# Patient Record
(demographics unavailable — no encounter records)

---

## 2018-01-01 NOTE — PN
Interval History: 


 Intake and Output











 18





 06:59 07:59 08:59 09:59


 


Weight    6 lb 7.353 oz








Method of Feeding: Breast feeding


Feeding Frequency: Ad Cortney


Feeding Status: Without Difficulty


Maternal Nipple Condition: Bilateral Normal





Measurements


Current Weight: 6 lb 7.353 oz


Weight in lbs and ozs: 6 lbs and 7 oz


Weight Yesterday: 6 lb 11.938 oz


Weight Gain/Loss Since Last Weight In Grams: 130.0 Loss


Birth Weight: 6 lb 12.432 oz


Birthweight in lbs and ozs: 6 lbs and 12 oz


% Weight Gain/Loss from Birth Weight: 5% Loss


Length: 18.5 in


Head Circumference in inches: 14.5


Abdominal Girth in cm: 30.5


Abdominal Girth in inches: 12.008





Vitals


Vital Signs: 


 Vital Signs











  18





  12:00 16:25 20:45


 


Temperature 98.1 F 98.6 F 99.2 F


 


Pulse Rate 142 144 142


 


Respiratory 44 44 48





Rate   














  18





  00:02 04:00 07:36


 


Temperature 98.7 F 98.2 F 98.1 F


 


Pulse Rate 136 142 144


 


Respiratory 44 50 42





Rate   














Medications


Home Medications: 


 Home Medications











 Medication  Instructions  Recorded  Confirmed  Type


 


NK [No Home Medications Reported]  18 History











Inpatient Medications: 


 Medications





Dextrose (Glutose Oral Nicu*)  0 ml BUCCAL .SEE MD INSTRUCTIONS PRN; Protocol


   PRN Reason: ASYMTOMATIC HYPOGLYCEMIA











Results/Investigations


Transcutaneous Bilirubin Result: 1.5


Time Obtained: 04:00


Age in Hours: 33


Risk Zone: Low Risk


Major Jaundice Risk Factors: None


Minor Jaundice Risk Factors: Breastfeeding, Mother > 24 yrs old


Decreased Jaundice Risk: Bili in low risk zone


CCHD Screen: Passed


Lab Results: 


 











  18





  18:31 18:31 18:31


 


WBC   


 


RBC   


 


Hgb   


 


Hct   


 


MCV   


 


MCH   


 


MCHC   


 


RDW   


 


Plt Count   


 


MPV   


 


Neut % (Auto)   


 


Lymph % (Auto)   


 


Mono % (Auto)   


 


Eos % (Auto)   


 


Baso % (Auto)   


 


Absolute Neuts (auto)   


 


Absolute Lymphs (auto)   


 


Absolute Monos (auto)   


 


Absolute Eos (auto)   


 


Absolute Basos (auto)   


 


Absolute Nucleated RBC   


 


Nucleated RBC %   


 


POC Glucose (mg/dL)   


 


Total Bilirubin  1.10  


 


RPR   Nonreactive 


 


Blood Type    O Positive


 


Direct Antiglob Test    Negative














  18





  19:50 21:36 00:42


 


WBC   


 


RBC   


 


Hgb   


 


Hct   


 


MCV   


 


MCH   


 


MCHC   


 


RDW   


 


Plt Count   


 


MPV   


 


Neut % (Auto)   


 


Lymph % (Auto)   


 


Mono % (Auto)   


 


Eos % (Auto)   


 


Baso % (Auto)   


 


Absolute Neuts (auto)   


 


Absolute Lymphs (auto)   


 


Absolute Monos (auto)   


 


Absolute Eos (auto)   


 


Absolute Basos (auto)   


 


Absolute Nucleated RBC   


 


Nucleated RBC %   


 


POC Glucose (mg/dL)  50  60  54


 


Total Bilirubin   


 


RPR   


 


Blood Type   


 


Direct Antiglob Test   














  18





  04:41 09:15


 


WBC   15.0


 


RBC   6.10


 


Hgb   22.5


 


Hct   67


 


MCV   109


 


MCH   37


 


MCHC   34


 


RDW   18 H


 


Plt Count   260


 


MPV   8


 


Neut % (Auto)   61.7


 


Lymph % (Auto)   24.7 L


 


Mono % (Auto)   9.7 H


 


Eos % (Auto)   2.7


 


Baso % (Auto)   1.2


 


Absolute Neuts (auto)   9.2


 


Absolute Lymphs (auto)   3.7


 


Absolute Monos (auto)   1.5 H


 


Absolute Eos (auto)   0.4


 


Absolute Basos (auto)   0.2


 


Absolute Nucleated RBC   0.4


 


Nucleated RBC %   2.4


 


POC Glucose (mg/dL)  50 


 


Total Bilirubin  


 


RPR  


 


Blood Type  


 


Direct Antiglob Test  











Assessment: 


Lactation Note:


FT AGA infant born 18 at 1830 via  to a 31 yo -4 mother who is  O+

.  maternal history of suicidal ideation, smoking 1 ppd, and gestational 

diabetes.  Infant has had stable blood glucoses, she has been cleared by social 

work to return home with family. Mother notes she tried to breastfeed older 

children, but milk "dried up" after about 2 weeks.  Now at 5% weight loss. 

Mother notes  no pain or pain and pinching with most feeds.





We reviewed positioning at length; ideally mother slightly reclined to a 

position of comfort, infant skin to skin, with ear/shoulder/hips in alignment. 

Reviewed how to pull the bottom jaw down, and guide infant onto the breast in a 

more deep fashion. Infant latched in cross cradle position,  Lips are flanged, 

mother comfortable; good jaw undulation noted. 





Plan discharge today; reviewed ideally feeding once every 2-3 hours once home, 

and will follow up in the office tomorrow,  with Dr Radford at 10:45.

## 2018-01-01 NOTE — HP
Information from Mother's Record: 





 Previous Pregnancy/Births











Maternal Age                   32


 


Grav                           6


 


Para                           3


 


SAB                            2


 


IEA                            0


 


LC                             3


 


Maternal Blood Type and Rh     O Positive








 Testing Needs/Results











Gestational Age in Weeks and   39 Weeks and 2 Days





Days                           


 


Determined By                  LMP


 


Violence or Abuse During this  No





Pregnancy                      


 


Feeding Plan                   Breast,Formula,Undecided


 


Planned Infant Care Provider   [facility]





Post-Discharge                 


 


Serology/RPR Result            Non-Reactive


 


Rubella Result                 Immune


 


HBsAg Result                   Negative


 


HIV Result                     Negative


 


GBS Culture Result             Negative











 Significant Medical History











Hx Diabetes                    No


 


Hx Thyroid Disease             No


 


Hx Hypothyroidism              No


 


Hx Hypertension                No


 


Hx Depression                  Yes


 


Hx Anxiety                     Yes


 


Other Psychiatric Issues/      Yes: reports  hospitalization for suicide





Disorders                      ideation, but "i wasn't suicidal"


 


Hx Asthma                      No: uses albuterol for occsional "illness"


 


Hx Kidney Infection            Yes


 


Hx  Section            No


 


Hx Other Reproductive          Yes: H/O retained placenta





Disorders/Problems             


 


Other Pertinent Medical        Postpartum hem X3





History                        








 Tobacco/Alcohol/Substance Use











Smoking Status (MU)            Heavy Tobacco Smoker


 


Type                           Cigarettes


 


Amount Used/How Often          1 ppd


 


Household Exposure             Yes


 


Household Exposure Type        Cigarettes


 


Alcohol Use                    Occasionally


 


Alcohol Amount                 pt reports havng "5 sips" 2/10/18


 


Substance Use Type             None


 


Substance Use Comment - Amount stopped marijuana after pregnancy





& Last Used                    








 Delivery Information/Events of Note











Date of Birth [A]              18


 


Time of Birth [A]              18:30


 


Delivery Method [A]            Spontaneous Vaginal


 


Labor [A]                      Induced


 


Did Patient attempt ? [A]  N/A, No Previous C-Sectio


 


Amniotic Fluid [A]             Clear


 


Anesthesia/Analgesia [A]       None


 


Level of Nursery               Regular/Bedside


 


Delivery Events of Note        Pitocin During Labor


 


Delivery Events of Note        pitocin and cytotec post delivery





Comment                        

















Delivery Events


Date of Birth: 18


Time of Birth: 18:30


Apgar Score 1 Minute: 9


Apgar Score 5 Minutes: 9


Gestational Age Weeks: 39


Gestational Age Days: 2


Delivery Type: Vaginal


Amniotic Fluid: Clear


Intrapartal Antibiotics Indicated: None Apply


ROM Length: ROM < 18 Hours


Antibiotic Treatment: No Antibx, or ANY Antibx Given < 2hrs Prior to Delivery


Hepatitis B Vaccine: Given Within 12 Hours


Immunoglobulin Given: No


 Drug Withdrawal Risk: None Apply


Hepatitis B Status/Risk: Mother HBsAg NEGATIVE With No New Risk Factors


Maternal Consent: Mother CONSENTS To Infant Hepatitis Vaccine +/- HBIG


Additional Identified Prenatal/Delivery Events of Concern: mother admits to 

drinking day before induction.   drug screen ordered per provider





Hypoglycemia Assessment


Hypoglycemia Risk - High: Gestational Diabetes


Hypoglycemia Symptoms: None





Measurements


Current Weight: 3.06 kg


Weight in lbs and ozs: 6 lbs and 12 oz


Weight Yesterday: 3.074 kg


Weight Gain/Loss Since Last Weight In Grams: 14.0 Loss


Birth Weight: 3.074 kg


Birthweight in lbs and ozs: 6 lbs and 12 oz


% Weight Gain/Loss from Birth Weight: No Change


Length: 18.5 in


Head Circumference in inches: 14.5


Abdominal Girth in cm: 30.5


Abdominal Girth in inches: 12.008





Vitals


Vital Signs: 


 Vital Signs











  18





  19:00 20:26 20:30


 


Temperature 98.2 F 97.8 F 99.2 F


 


Pulse Rate 148 130 160


 


Respiratory 52 40 48





Rate   














  18





  21:40 22:30 00:20


 


Temperature 98.1 F 98.2 F 98.2 F


 


Pulse Rate 146 140 128


 


Respiratory 50 56 60





Rate   














  18





  04:20 08:07


 


Temperature 99.3 F 98.8 F


 


Pulse Rate 124 144


 


Respiratory 60 44





Rate  














[place] Physical Exam


General Appearance: Alert, Active


Skin Color: Normal


Level of Distress: No Distress


Nutritional Status: AGA


General Appearance Description: 





mild perioral cyanosis


Cranial Features: Normal head shape, Symmetric facial features, Normal 

fontanelles


Eyes: Bilateral Normal, Bilateral Red Reflex


Ears: Symmetrical, Normal Position, Canals Patent


Oropharynx: Normal: Lips, Mouth, Gums, Uvula


Neck: Normal Tone


Respiratory Effort: Normal


Respiratory Rate: Normal


Chest Appearance: Normal, Areola Breast 3-4 mm Size, Symmetrical


Auscultation: Bilateral Good Air Exchange


Breath Sounds: NL Both Lungs


Location of Apical Pulse: Normal


Rhythm: Regular


Heart Sounds: Normal: S1, S2


Abnormal Heart Sounds: No Murmurs, No S3, No S4


Brachial Pulses: Bilateral Normal


Femoral Pulses: Bilateral Normal


Umbilicus Assessment: Yes Normal


Abdomen: Normal


Abdomen Palpation: Liver Normal, Spleen Normal


Hernia: None


Anus: Patent


Location of Anus: Normal


Genital Appearance: Female


Enlarged Nodes: None


External Genitalia: Normal: Labia, Clitoris, Introitus


Urethral Meatus: Normal


Vagina: Normal for Gestational Age


Clavicles: Normal


Arms: 2 Symmetrical Extremities, Full Range of Motion


Hands: 2 Hands, Symmetrical, 5 Fingers on Each Hand, Full Range of Motion


Left Hip: Normal ROM


Right Hip: Normal ROM


Legs: 2 Symmetrical Extremities, Full Range of Motion


Feet: 2 Feet, Symmetrical, Creases on 2/3 of Soles, Full Range of Motion


Spine: Normal


Skin Texture: Smooth, Soft


Skin Appearance: No Abnormalities


Neuro: Normal: Simone, Sucking, Muscle Tone


Cranial Nerve Exam: Cranial N. II-XII Normal


Deep Tendon Reflexes: Normal: Bicep, Knee, Ankle





Medications


Home Medications: 


 Home Medications











 Medication  Instructions  Recorded  Confirmed  Type


 


NK [No Home Medications Reported]  18 History











Inpatient Medications: 


 Medications





Dextrose (Glutose Oral Nicu*)  0 ml BUCCAL .SEE MD INSTRUCTIONS PRN; Protocol


   PRN Reason: ASYMTOMATIC HYPOGLYCEMIA











Results/Investigations


Lab Results: 


 











  18





  18:31 18:31 19:50


 


POC Glucose (mg/dL)    50


 


Total Bilirubin  1.10  


 


Blood Type   O Positive 


 


Direct Antiglob Test   Negative 














  18





  21:36 00:42 04:41


 


POC Glucose (mg/dL)  60  54  50


 


Total Bilirubin   


 


Blood Type   


 


Direct Antiglob Test   











-: 





pre and post ductal sats 95-96%





Assessment





- Status


Status: Full-term, AGA


Condition: Stable


Assessment: 





Healthy FT infant, DOL #1.  Nursing concerns raised overnight about mother's 

status, as she was noted to be unable to stay awake, stumbling and behaving as 

if under the influence of drugs or alcohol.   MOther's urine screen was 

negative. SW consult pending as other children are not in mother's care and 

mother appeared too sleepy to safely care for her child.  Mother notes to 

examiner that she had not slept for 3 days and was exhausted.  Examiner 

explained that because of concerns raised by nursing staff about possible drug 

use, that we needed to screen the infant and she had no objections.

## 2018-01-01 NOTE — DS
Prenatal Information: 





 Previous Pregnancy/Births











Maternal Age                   32


 


Grav                           6


 


Para                           3


 


SAB                            2


 


IEA                            0


 


LC                             3


 


Maternal Blood Type and Rh     O Positive








 Testing Needs/Results











Gestational Age in Weeks and   39 Weeks and 2 Days





Days                           


 


Determined By                  LMP


 


Violence or Abuse During this  No





Pregnancy                      


 


Feeding Plan                   Breast,Formula,Undecided


 


Planned Infant Care Provider   [facility]





Post-Discharge                 


 


Serology/RPR Result            Non-Reactive


 


Rubella Result                 Immune


 


HBsAg Result                   Negative


 


HIV Result                     Negative


 


GBS Culture Result             Negative











 Significant Medical History











Hx Diabetes                    No


 


Hx Thyroid Disease             No


 


Hx Hypothyroidism              No


 


Hx Hypertension                No


 


Hx Depression                  Yes


 


Hx Anxiety                     Yes


 


Other Psychiatric Issues/      Yes: reports 2014 hospitalization for suicide





Disorders                      ideation, but "i wasn't suicidal"


 


Hx Asthma                      No: uses albuterol for occsional "illness"


 


Hx Kidney Infection            Yes


 


Hx  Section            No


 


Hx Other Reproductive          Yes: H/O retained placenta





Disorders/Problems             


 


Other Pertinent Medical        Postpartum hem X3





History                        








 Tobacco/Alcohol/Substance Use











Smoking Status (MU)            Heavy Tobacco Smoker


 


Type                           Cigarettes


 


Amount Used/How Often          1 ppd


 


Household Exposure             Yes


 


Household Exposure Type        Cigarettes


 


Alcohol Use                    Occasionally


 


Alcohol Amount                 pt reports havng "5 sips" 2/10/18


 


Substance Use Type             None


 


Substance Use Comment - Amount stopped marijuana after pregnancy





& Last Used                    








 Delivery Information/Events of Note











Date of Birth [A]              18


 


Time of Birth [A]              18:30


 


Delivery Method [A]            Spontaneous Vaginal


 


Labor [A]                      Induced


 


Did Patient attempt ? [A]  N/A, No Previous C-Sectio


 


Amniotic Fluid [A]             Clear


 


Anesthesia/Analgesia [A]       None


 


Level of Nursery               Regular/Bedside


 


Delivery Events of Note        Pitocin During Labor


 


Delivery Events of Note        pitocin and cytotec post delivery





Comment                        

















Delivery Events


Date of Birth: 18


Time of Birth: 18:30


Apgar Score 1 Minute: 9


Apgar Score 5 Minutes: 9


Gestational Age Weeks: 39


Gestational Age Days: 2


Delivery Type: Vaginal


Amniotic Fluid: Clear


Intrapartal Antibiotics Indicated: None Apply


ROM Length: ROM < 18 Hours


Antibiotic Treatment: No Antibx, or ANY Antibx Given < 2hrs Prior to Delivery


Hepatitis B Vaccine: Given Within 12 Hours


Immunoglobulin Given: No


 Drug Withdrawal Risk: None Apply


Hepatitis B Status/Risk: Mother HBsAg NEGATIVE With No New Risk Factors


Maternal Consent: Mother CONSENTS To Infant Hepatitis Vaccine +/- HBIG


Additional Identified Prenatal/Delivery Events of Concern: mother admits to 

drinking day before induction.   drug screen ordered per provider


Date of Service: 18


Interval History: 





Baby stable overnight. Perioral cyanosis noted on exam yesterday - nurses 

report that it has resolved at this time. 





Just after delivery there were nursing concerns that mother was difficulty to 

arose and there was concern raised that mother was potentially under the 

influence of alcohol or drugs. Urine and meconium drug screens were sent on 

baby and results are pending. SW consult yesterday. Day shift nurse reports 

that mother has been awake and appropriate with baby both yesterday and today. 





Mother is breast feeding. Baby is voiding and stooling well. Stools are 

transitional at this time. 





Mother w/ hx of GDM. BG checks WNLs. 





Initially CCHD screening w/ O2 sats 95%/95%; this was repeated on day of 

discharge and repeat showed 100%/100%.





Method of Feeding: Breast feeding


Feeding Frequency: Ad Estefanía


Stool Passed: Yes


Stools in Past 24 Hours: 2


Voiding: Yes


Times Voided in Past 24 Hours: 3





Measurements


Current Weight: 2.93 kg


Weight in lbs and ozs: 6 lbs and 7 oz


Weight Yesterday: 3.06 kg


Weight Gain/Loss Since Last Weight In Grams: 130.0 Loss


Birth Weight: 3.074 kg


Birthweight in lbs and ozs: 6 lbs and 12 oz


% Weight Gain/Loss from Birth Weight: 5% Loss


Length: 18.5 in


Head Circumference in inches: 14.5


Abdominal Girth in cm: 30.5


Abdominal Girth in inches: 12.008





Vitals


Vital Signs: 


 Vital Signs











  18





  12:00 16:25 20:45


 


Temperature 98.1 F 98.6 F 99.2 F


 


Pulse Rate 142 144 142


 


Respiratory 44 44 48





Rate   














  18





  00:02 04:00 07:36


 


Temperature 98.7 F 98.2 F 98.1 F


 


Pulse Rate 136 142 144


 


Respiratory 44 50 42





Rate   














[place] Physical Exam


General Appearance: Alert, Active


Skin Color: Normal


Level of Distress: No Distress


Cranial Features: Normal head shape, Normal fontanelles


Neck: Normal Tone


Respiratory Effort: Normal


Respiratory Rate: Normal


Auscultation: Bilateral Good Air Exchange


Breath Sounds: NL Both Lungs


Rhythm: Regular


Abnormal Heart Sounds: No Murmurs, No S3, No S4


Femoral Pulses: Bilateral Normal


Umbilicus Assessment: Yes Normal


Abdomen: Normal


Abdomen Palpation: Liver Normal, Spleen Normal


Clavicles: Normal


Left Hip: Normal ROM


Right Hip: Normal ROM


Skin Texture: Smooth, Soft


Skin Appearance: No Abnormalities


Neuro: Normal: Simone, Sucking, Muscle Tone


Cranial Nerve Exam: Cranial N. II-XII Normal





Medications


Home Medications: 


 Home Medications











 Medication  Instructions  Recorded  Confirmed  Type


 


NK [No Home Medications Reported]  18 History











Inpatient Medications: 


 Medications





Dextrose (Glutose Oral Nicu*)  0 ml BUCCAL .SEE MD INSTRUCTIONS PRN; Protocol


   PRN Reason: ASYMTOMATIC HYPOGLYCEMIA











Results/Investigations


Transcutaneous Bilirubin Result: 1.5


Time Obtained: 04:00


Age in Hours: 33


Risk Zone: Low Risk


Major Jaundice Risk Factors: None


Minor Jaundice Risk Factors: Breastfeeding, Mother > 24 yrs old


Decreased Jaundice Risk: Bili in low risk zone


CCHD Screen: Passed


Lab Results: 


 











  18





  18:31 18:31 18:31


 


WBC   


 


RBC   


 


Hgb   


 


Hct   


 


MCV   


 


MCH   


 


MCHC   


 


RDW   


 


Plt Count   


 


MPV   


 


Neut % (Auto)   


 


Lymph % (Auto)   


 


Mono % (Auto)   


 


Eos % (Auto)   


 


Baso % (Auto)   


 


Absolute Neuts (auto)   


 


Absolute Lymphs (auto)   


 


Absolute Monos (auto)   


 


Absolute Eos (auto)   


 


Absolute Basos (auto)   


 


Absolute Nucleated RBC   


 


Nucleated RBC %   


 


POC Glucose (mg/dL)   


 


Total Bilirubin  1.10  


 


RPR   Nonreactive 


 


Blood Type    O Positive


 


Direct Antiglob Test    Negative














  18





  19:50 21:36 00:42


 


WBC   


 


RBC   


 


Hgb   


 


Hct   


 


MCV   


 


MCH   


 


MCHC   


 


RDW   


 


Plt Count   


 


MPV   


 


Neut % (Auto)   


 


Lymph % (Auto)   


 


Mono % (Auto)   


 


Eos % (Auto)   


 


Baso % (Auto)   


 


Absolute Neuts (auto)   


 


Absolute Lymphs (auto)   


 


Absolute Monos (auto)   


 


Absolute Eos (auto)   


 


Absolute Basos (auto)   


 


Absolute Nucleated RBC   


 


Nucleated RBC %   


 


POC Glucose (mg/dL)  50  60  54


 


Total Bilirubin   


 


RPR   


 


Blood Type   


 


Direct Antiglob Test   














  18





  04:41 09:15


 


WBC   15.0


 


RBC   6.10


 


Hgb   22.5


 


Hct   67


 


MCV   109


 


MCH   37


 


MCHC   34


 


RDW   18 H


 


Plt Count   260


 


MPV   8


 


Neut % (Auto)   61.7


 


Lymph % (Auto)   24.7 L


 


Mono % (Auto)   9.7 H


 


Eos % (Auto)   2.7


 


Baso % (Auto)   1.2


 


Absolute Neuts (auto)   9.2


 


Absolute Lymphs (auto)   3.7


 


Absolute Monos (auto)   1.5 H


 


Absolute Eos (auto)   0.4


 


Absolute Basos (auto)   0.2


 


Absolute Nucleated RBC   0.4


 


Nucleated RBC %   2.4


 


POC Glucose (mg/dL)  50 


 


Total Bilirubin  


 


RPR  


 


Blood Type  


 


Direct Antiglob Test  














Hospital Course


Hearing Screen: Passed Both, Signed


Left Ear: Passed, TEOAE


Right Ear: Passed, TEOAE


Hepatitis B Vaccine: Given Within 12 Hours


[place] Screening: Done





Assessment





- Assessment


Condition at Discharge: Stable


Discharge Disposition: Home


Assessment Comments: 





2 day old FT AGA female born to a 33 y/o ->4 O+/GBS-/PNL- mother via SVA at 

39 2/7 weeks. Apgars 9/9. Pregnancy complicated by maternal smoking (1 ppd), 

occasional use of EtOH and GDM. Mother's urine drug screening neg. BBT O+/SAMMI-. 

Baby is breast feeding ad estefanía; weight today is down 5% from BW and baby is 

voiding and stooling well. TC bili 1.5 at 33 hrs = low risk. Passed CCHD (100%/

100%) and hearing screening. BG checks for IDM WNLs. Hep B vaccine was given.





After delivery there were nursing concerns that mother was difficulty to arouse 

and there was concern raised by nursing staff that mother was potentially under 

the influence of alcohol or drugs. Urine and meconium drug screens were sent on 

baby and results are pending. Mother's urine drug screen on admission was neg. 

Day shift nurse reports that mother has been awake and appropriate with baby 

both yesterday and today, and had reported to mother that she has recently been 

sick and not sleeping well just prior to delivery. Of note mother does not 

currently have custody of her 3 other children. Seen and cleared by DANIEL Berman 

for d/c to home. 





Plan





- Follow Up Care


Follow Up Care Provider: [facility]


Follow up date: 18


Appointment Status: Scheduled





- Anticipatory Guidance/Instruction


Provided Guidance to: Mother


Guidance and Instruction: signs of illness, feeding schedule/plan, use of car 

seat, signs of jaundice, contact physician on call, sleeping position, 

umbilicus care, limit exposure to others, hazards of second hand smoke